# Patient Record
Sex: FEMALE | Race: OTHER
[De-identification: names, ages, dates, MRNs, and addresses within clinical notes are randomized per-mention and may not be internally consistent; named-entity substitution may affect disease eponyms.]

---

## 2021-04-20 ENCOUNTER — APPOINTMENT (OUTPATIENT)
Dept: PLASTIC SURGERY | Facility: CLINIC | Age: 61
End: 2021-04-20
Payer: SELF-PAY

## 2021-04-20 VITALS
RESPIRATION RATE: 18 BRPM | DIASTOLIC BLOOD PRESSURE: 73 MMHG | HEART RATE: 60 BPM | HEIGHT: 63 IN | SYSTOLIC BLOOD PRESSURE: 108 MMHG | TEMPERATURE: 97.8 F | BODY MASS INDEX: 22.15 KG/M2 | OXYGEN SATURATION: 99 % | WEIGHT: 125 LBS

## 2021-04-20 PROBLEM — Z00.00 ENCOUNTER FOR PREVENTIVE HEALTH EXAMINATION: Status: ACTIVE | Noted: 2021-04-20

## 2021-04-20 PROCEDURE — 99214 OFFICE O/P EST MOD 30 MIN: CPT | Mod: NC

## 2021-04-21 RX ORDER — PANTOPRAZOLE 40 MG/1
40 TABLET, DELAYED RELEASE ORAL
Qty: 30 | Refills: 0 | Status: ACTIVE | COMMUNITY
Start: 2021-03-16

## 2021-04-21 NOTE — ASSESSMENT
[FreeTextEntry1] : pt has a beautiful result but desires smaller appearance with tighter skin envelope.  will revisit mastopexy and replace 230 cc with 150.  pt wants implants and will use gel

## 2021-04-21 NOTE — HISTORY OF PRESENT ILLNESS
[FreeTextEntry1] : pt is s/p breast aug mastopexy 10 y ago.  although the implants are soft and the scars are good and she has good shape and symmetry  pt feels too matronly.  she desires more lift and also desires smaller implants  . The risks, benefits, alternatives, limitations and the permanent scars were outlined with the patient. I have recommended that she not change anything as she has a small implant and can only go to 150 cc from 230.   also pt has no contracture and excellent and beautiful shape.  she is insistent on proceding with smaller implants  and will tighten skin envelope  without raising nipples too high

## 2021-06-14 ENCOUNTER — APPOINTMENT (OUTPATIENT)
Dept: PLASTIC SURGERY | Facility: HOSPITAL | Age: 61
End: 2021-06-14
Payer: SELF-PAY

## 2021-06-14 PROCEDURE — 19328 RMVL INTACT BREAST IMPLANT: CPT

## 2021-06-14 PROCEDURE — 19316 MASTOPEXY: CPT | Mod: NC,59

## 2021-06-14 PROCEDURE — 19340 INSJ BREAST IMPLT SM D MAST: CPT | Mod: NC,59

## 2021-06-18 ENCOUNTER — APPOINTMENT (OUTPATIENT)
Dept: PLASTIC SURGERY | Facility: CLINIC | Age: 61
End: 2021-06-18
Payer: SELF-PAY

## 2021-06-18 VITALS
OXYGEN SATURATION: 100 % | RESPIRATION RATE: 18 BRPM | TEMPERATURE: 98.1 F | HEART RATE: 75 BPM | SYSTOLIC BLOOD PRESSURE: 115 MMHG | DIASTOLIC BLOOD PRESSURE: 74 MMHG

## 2021-06-18 PROCEDURE — 99024 POSTOP FOLLOW-UP VISIT: CPT

## 2021-06-21 ENCOUNTER — APPOINTMENT (OUTPATIENT)
Dept: PLASTIC SURGERY | Facility: CLINIC | Age: 61
End: 2021-06-21
Payer: SELF-PAY

## 2021-06-21 VITALS
SYSTOLIC BLOOD PRESSURE: 137 MMHG | DIASTOLIC BLOOD PRESSURE: 79 MMHG | RESPIRATION RATE: 20 BRPM | TEMPERATURE: 98 F | HEART RATE: 57 BPM | OXYGEN SATURATION: 100 %

## 2021-06-21 DIAGNOSIS — Z98.82 BREAST IMPLANT STATUS: ICD-10-CM

## 2021-06-21 DIAGNOSIS — N64.81 PTOSIS OF BREAST: ICD-10-CM

## 2021-06-21 PROCEDURE — 99024 POSTOP FOLLOW-UP VISIT: CPT

## 2021-06-22 ENCOUNTER — APPOINTMENT (OUTPATIENT)
Dept: PLASTIC SURGERY | Facility: CLINIC | Age: 61
End: 2021-06-22

## 2021-06-24 ENCOUNTER — APPOINTMENT (OUTPATIENT)
Dept: PLASTIC SURGERY | Facility: CLINIC | Age: 61
End: 2021-06-24
Payer: SELF-PAY

## 2021-06-24 VITALS
SYSTOLIC BLOOD PRESSURE: 127 MMHG | DIASTOLIC BLOOD PRESSURE: 71 MMHG | RESPIRATION RATE: 20 BRPM | HEART RATE: 70 BPM | TEMPERATURE: 98.2 F | OXYGEN SATURATION: 100 %

## 2021-06-24 PROCEDURE — 99024 POSTOP FOLLOW-UP VISIT: CPT

## 2021-06-25 NOTE — ASSESSMENT
[FreeTextEntry1] : excellent shape and symmetry scarss well healed  contact derm has resolved  The instructions were reviewed in detail with JOHN.

## 2021-06-25 NOTE — HISTORY OF PRESENT ILLNESS
[FreeTextEntry1] : contact dermatitis has resolved  mild residual erythema from inflammation seen  no infection or further delay in healing  The instructions were reviewed in detail with JOHN. JOHN will return to the office for a post procedure visit in several months

## 2021-06-30 NOTE — HISTORY OF PRESENT ILLNESS
[FreeTextEntry1] : pt is s/p mastopexy and smaller implants and she is doing well and happy but feels too itchy on scars .  the breasts are soft and symmetrical and there is no evidence of allergy to the tapes or dermabond  JOHN will return to the office for a post procedure visit prn worsening itch or blisters

## 2021-06-30 NOTE — ASSESSMENT
[FreeTextEntry1] : JOHN  has had uncomplicated recovery from surgery and anesthesia The instructions were reviewed in detail with JOHN. JOHN will return to the office for a post procedure visit 3 weeks post op for tap[e removal

## 2021-07-14 PROBLEM — N64.81 PTOSIS OF BREAST: Status: ACTIVE | Noted: 2021-06-25

## 2021-07-14 PROBLEM — Z98.82 BREAST IMPLANT STATUS: Status: ACTIVE | Noted: 2021-06-30

## 2021-07-14 NOTE — HISTORY OF PRESENT ILLNESS
[FreeTextEntry1] : pt is hqaving a reaction to the steri strips and the dermabond  all is removed and instructions reviewed  JOHN will return to the office for a post procedure visit in a few days to be sure allergic reaction is waning

## 2021-07-14 NOTE — ASSESSMENT
[FreeTextEntry1] : pt is doing well x for contact dermatitis along scar which is intact except for a red rash not hot and  no drainage The instructions were reviewed in detail with JOHN. All of JOHN 's questions were answered completely JOHN will return to the office for a post procedure visit

## 2021-12-17 ENCOUNTER — APPOINTMENT (OUTPATIENT)
Dept: PLASTIC SURGERY | Facility: CLINIC | Age: 61
End: 2021-12-17